# Patient Record
Sex: FEMALE | Race: WHITE
[De-identification: names, ages, dates, MRNs, and addresses within clinical notes are randomized per-mention and may not be internally consistent; named-entity substitution may affect disease eponyms.]

---

## 2018-10-16 ENCOUNTER — HOSPITAL ENCOUNTER (INPATIENT)
Dept: HOSPITAL 74 - JLDR | Age: 33
LOS: 2 days | Discharge: HOME | End: 2018-10-18
Attending: OBSTETRICS & GYNECOLOGY | Admitting: OBSTETRICS & GYNECOLOGY
Payer: COMMERCIAL

## 2018-10-16 VITALS — BODY MASS INDEX: 29.7 KG/M2

## 2018-10-16 DIAGNOSIS — O48.0: Primary | ICD-10-CM

## 2018-10-16 DIAGNOSIS — Z3A.40: ICD-10-CM

## 2018-10-16 LAB
ANION GAP SERPL CALC-SCNC: 9 MMOL/L (ref 8–16)
APTT BLD: 26.1 SECONDS (ref 25.2–36.5)
ARTERIAL BLOOD GAS PCO2: 51.7 MMHG (ref 35–45)
BASE EXCESS BLDA CALC-SCNC: -4.1 MEQ/L (ref -2–2)
BASOPHILS # BLD: 0.3 % (ref 0–2)
BUN SERPL-MCNC: 10 MG/DL (ref 7–18)
CALCIUM SERPL-MCNC: 8.1 MG/DL (ref 8.5–10.1)
CHLORIDE SERPL-SCNC: 109 MMOL/L (ref 98–107)
CO2 SERPL-SCNC: 22 MMOL/L (ref 21–32)
CREAT SERPL-MCNC: 0.6 MG/DL (ref 0.55–1.3)
DEPRECATED RDW RBC AUTO: 13.8 % (ref 11.6–15.6)
EOSINOPHIL # BLD: 2.6 % (ref 0–4.5)
GLUCOSE SERPL-MCNC: 69 MG/DL (ref 74–106)
HCT VFR BLD CALC: 37.1 % (ref 32.4–45.2)
HGB BLD-MCNC: 12.2 GM/DL (ref 10.7–15.3)
INR BLD: 0.91 (ref 0.83–1.09)
LYMPHOCYTES # BLD: 31.2 % (ref 8–40)
MCH RBC QN AUTO: 28.3 PG (ref 25.7–33.7)
MCHC RBC AUTO-ENTMCNC: 33 G/DL (ref 32–36)
MCV RBC: 85.7 FL (ref 80–96)
MONOCYTES # BLD AUTO: 6.2 % (ref 3.8–10.2)
NEUTROPHILS # BLD: 59.7 % (ref 42.8–82.8)
PH BLDV: 7.32 [PH] (ref 7.32–7.42)
PLATELET # BLD AUTO: 138 K/MM3 (ref 134–434)
PMV BLD: 10.7 FL (ref 7.5–11.1)
PO2 BLDA: 17.9 MMHG (ref 80–100)
POTASSIUM SERPLBLD-SCNC: 4.1 MMOL/L (ref 3.5–5.1)
PT PNL PPP: 10.7 SEC (ref 9.7–13)
RBC # BLD AUTO: 4.33 M/MM3 (ref 3.6–5.2)
SAO2 % BLDA: 24.5 % (ref 90–98.9)
SODIUM SERPL-SCNC: 140 MMOL/L (ref 136–145)
VENOUS PC02: 44.6 MMHG (ref 38–52)
VENOUS PO2: 31.9 MMHG (ref 28–48)
WBC # BLD AUTO: 6.9 K/MM3 (ref 4–10)

## 2018-10-16 PROCEDURE — 0HQ9XZZ REPAIR PERINEUM SKIN, EXTERNAL APPROACH: ICD-10-PCS | Performed by: OBSTETRICS & GYNECOLOGY

## 2018-10-16 RX ADMIN — IBUPROFEN PRN MG: 600 TABLET, FILM COATED ORAL at 19:47

## 2018-10-16 RX ADMIN — ACETAMINOPHEN PRN MG: 325 TABLET ORAL at 19:46

## 2018-10-16 NOTE — HP
Past Medical History





- Primary Care Physician


PCP:: Lester Damon





- Admission


Chief Complaint: 34yo P1 with pregnancy at EGA 40w4d admitted in early 

spontaneous labor.


History of Present Illness: 





Pt was supposed to come if for labor induction but presented in spontaneous 

labor.


Post term pregnancy


AMA


History Source: Patient, Medical Record


Limitations to Obtaining History: No Limitations





- Past Medical History


CNS: No: Alzheimer's, CVA, Dementia, Migraine, Multiple Sclerosis, Peripheral 

Neuropathy, Parkinson's, Seizure, Syncope, TIA, Vertigo, Other


Cardiovascular: No: AFIB, Aneurysm, Aortic Insufficiency, Aortic Stenosis, CAD, 

CHF, Deep Vein Thrombosis, HTN, Hyperlipdemia, MI, Mitral Insufficiency, Mitral 

Stenosis, Murmur, Pulmonary Hypertension, Other


Pulmonary: No: Asthma, Bronchitis, Cancer, COPD, O2 Dependent, Pneumonia, 

Previously Intubated, Pulmonary Embolus, Pulmonary Fibrosis, Sleep Apnea, Other


Gastrointestinal: No: Ascites, Cancer, Constipation, Crohn's Disease, 

Diverticulitis, Diverticulosis, Esophageal Varices, Gastritis, GERD, GI Bleed, 

Hemorrhoids, Hiatal Hernia, Inflamatory Bowel Disease, Irritable Bowel Disease, 

Pancreatitis, Peptic Ulcer Disease, Ulcerative Colitis, Other


Hepatobiliary: No: Cirrhosis, Cholelithiasis, Cholecystitis, Choledocholithiasis

, Hepatitis A, Hepatitis B, Hepatitis C, Other


Renal/: No: Renal Failure, Renal Inusuff, BPH, Cancer, Hematuria, Hemodialysis

, Neurogenic Bladder, Renal Calculi, UTI, Other


Reproductive: No: Ectopic Pregnancy, Endometriosis, Fibroids, PID, Polycystic 

Ovary Syndrome, Postmenopausal, Other


...: 2


...Para: 1


...Term: 1


...: 0


...Spon : 0


...Induced : 0


...Multiple Gestation: 0


...LMP: 18


... Weeks Gestation by Dates: 40.4


...EDC by Dates: 10/15/18


...EDC by Sono: 10/12/18


Heme/Onc: No: Anemia, B12 Deficiency, Bleeding Disorder, Cancer, Current 

Chemotherapy, Current Radiation Therapy, Hemochromatosis, Hypercoaguable State, 

Myeloproliferative Synd, Sickle Cell Disease, Sickle Cell Trait, 

Thrombocytopenia, Other


Infectious Disease: No: AIDS, C-Diff, Herpes Zoster, HIV, MRSA, STD's, 

Tuberculosis, VREF, Other


Psych: No: Addictions, Anxiety, Bipolar, Depression, Panic, Psychosis, 

Schizophrenia, Other


Musculoskeletal: No: Bursitis, Chronic low back pain, Hemiparesis, Hemiplegia, 

Osteoarthritis, Paraplegia, Other


Rheumatology: No: Fibromyalgia, Gout, Lupus, Rheumatoid Arthritis, Sarcoidosis, 

Vasculitis, Other


ENT: No: Allergic Rhinitis, Sinusitis, Other


Endocrine: No: [place]'s Disease, Cushing's Disease, Diabetes Insipidus, 

Diabetes Mellitus, Hyperparathyroidism, Hyperthyroidism, Hypothyroidism, 

Osteopenia, SIADH, Other


Dermatology: No: Basal Cell, Cellulitis, Eczema, Melanoma, Psoriasis, Squamous 

Cell, Other





- Past Surgical History


Past Surgical History: Yes: None


Hx Myomectomy: No


Hx Transabdominal Cerclage: No





- Smoking History


Smoking history: Never smoked


Have you smoked in the past 12 months: No





- Alcohol/Substance Use


Hx Alcohol Use: No


History of Substance Use: reports: None





- Social History


Usual Living Arrangement: Yes: With Spouse, With Child


ADL: Independent


Occupation: Teacher


History of Recent Travel: No





Home Medications





- Allergies


Allergies/Adverse Reactions: 


 Allergies











Allergy/AdvReac Type Severity Reaction Status Date / Time


 


No Known Allergies Allergy   Verified 10/16/18 08:13














- Home Medications


Home Medications: 


Ambulatory Orders





Prenatal Vit/Iron Fumarate/FA [Prenatal Tablet] 1 each PO DAILY 03/10/15 











Family Disease History





- Family Disease History


Family History: Denies





Review of Systems





- Review of Systems


Constitutional: reports: Other (Spontaneous contraction)


Eyes: reports: No Symptoms


HENT: reports: No Symptoms


Neck: reports: No Symptoms


Cardiovascular: reports: No Symptoms


Respiratory: reports: No Symptoms


Gastrointestinal: reports: No Symptoms


Genitourinary: reports: No Symptoms


Breasts: reports: No Symptoms Reported


Musculoskeletal: reports: No Symptoms


Integumentary: reports: No Symptoms


Neurological: reports: No Symptoms


Endocrine: reports: No Symptoms


Hematology/Lymphatic: reports: No Symptoms


Psychiatric: reports: No Symptoms


Pain Intensity: 6





Physical Exam - Maternity


Vital Signs: 


 Vital Signs











Temperature  97.3 F L  10/16/18 07:10


 


Pulse Rate  86   10/16/18 07:10


 


Respiratory Rate  18   10/16/18 07:10


 


Blood Pressure  125/62   10/16/18 07:10


 


O2 Sat by Pulse Oximetry (%)      











Constitutional: Yes: Well Nourished, No Distress, Calm


Eyes: Yes: WNL, Conjunctiva Clear


HENT: Yes: WNL, Atraumatic, Normocephalic


Neck: Yes: WNL, Supple, Trachea Midline


Cardiovascular: Yes: WNL, Regular Rate and Rhythm


Lungs: Clear to auscultation, Normal air movement





- Abdominal Exam/OB


Fundal Height: 40


Number of Fetuses: Single


Fetal Presentation: Vertex


Contractions: Yes


Regularity: Regular


Intensity: Moderate


Fetal Monitor Mode: External


Fetal Heart Rate (range): 135


Fetal Heart Rate Location: Midline


Category: I


Accelerations: Non-Uniform


Decelerations: None





- Vaginal Exam/OB


Vaginal Bleediing: No


Speculum Exam: No


Dilatation (cm): 2


Effacement (%): 80


Amniotic Membrane Status: Intact


Fetal Presentation: Vertex/Position


Fetal Station: -3





- Physical Exam


Musculoskeletal: Yes: WNL


Extremities: Yes: WNL


Edema: No


Integumentary: Yes: WNL


Deep Tendon Reflex Grade: Normal +2


...Motor Strength: WNL


Psychiatric: Yes: WNL, Alert, Oriented





- Labs


Lab Results: 


 CBC, BMP





 10/16/18 08:30 











Hemorrhage Risk Assessment





- Risk Factors


Medium Risk Factors: Yes: None


High Risk Factors: Yes: None


Risk Score: 1


Risk Level: Medium Risk





Assessment/Plan





34yo P1 with pregnancy at EGA 40w4d admitted in early spontaneous labor. Fetus 

with Category I tracing and requires no intervention. Patient is stable and 

requested IV sedation for pain and analgesia. Adequate gynecoid pelvimetry. 

Plan to monitor labor progress. Anticipate .

## 2018-10-16 NOTE — PN
Delivery





- Delivery


Vaginal Delivery: No Problems, Spontaneous


Type of Anesthesia: Epidural


Episiotomy/Laceration: Vaginal Extension/lac, 1st degree


EBL (cc): 300





Delivery, Single Birth





- Stages of Labor


Date 1st Stage Initiatied: 10/16/18


Time 1st Stage Initiated: 01:00


Date 2nd Stage Initiated: 10/16/18


Time 2nd Stage Initiated: 14:40


Date of Delivery: 10/16/18


Time of Delivery: 14:53


Date Placenta Delivered: 10/16/18


Time Placenta Delivered: 14:55


Placenta: Yes: Spontaneous, Normal Configuration





- Condition of Infant


Pediatrician/Neonatologist Present: No


Infant Gender: Male


Birth Weight: 3.884 kg


Position: Right, OA


Total Hours ROM (Hrs/Mins): 2 hours 25 minutes





- Apgar


  ** 1 Minute


Apgar Total Score: 9





  ** 5 Minutes


Apgar Total Score: 10





- [place] Feeding Plan


Initial Plan: Exclusive breastfeeding throughout hospitalization


Benefits of Breastfeeding Exclusively reinforced: Yes





Remarks





- Remarks


Remarks: 





 w/o complications.

## 2018-10-16 NOTE — PN
Ante-Partal Exam





- Subjective


Subjective: 





No complaints, s/p epidural


Vital Signs: 


 Vital Signs











Temperature  98.5 F   10/16/18 09:00


 


Pulse Rate  80   10/16/18 10:00


 


Respiratory Rate  18   10/16/18 10:00


 


Blood Pressure  127/80   10/16/18 10:00


 


O2 Sat by Pulse Oximetry (%)      











Bleeding: No


Headache: No


Visual changes: No


Right upper quadrant pain: No


Pain (scale 1-10): 0





- Contractions


Contractions: Yes


Regularity: Regular (q3min)


Intensity: Moderate


Fetal Monitor Mode: External





- Exam during Labor


Fetal Heart Rate: 135


Variability: Moderate


Fetal Heart Rate Location: Midline


Category: I


Fetal Monitor Accelerations: Present


Fetal Monitor Decelerations: None


Exam: Vaginal


Dilatation (cm): 8


Effacement (%): 90


Amniotic Membrane Status: Ruptured (AROM)


Amniotic Fluid: Meconium Stained


Meconium Staining: Moderate


Fetal Presentation: Vertex


Fetal Station: 0





- Intrapartum Hemorrhage Risk


Medium Risk Factors: None


High Risk Factors: None


Risk Score: 0


Risk Level: Low Risk





- Assessment/Plan


Assessment/Plan: 





34yo P1 with spont labor. Pt progressed in active labor. Fetus with Category I 

tracing. Plan to monitor progress.

## 2018-10-17 VITALS — HEART RATE: 77 BPM

## 2018-10-17 RX ADMIN — ACETAMINOPHEN PRN MG: 325 TABLET ORAL at 21:07

## 2018-10-17 RX ADMIN — ACETAMINOPHEN PRN MG: 325 TABLET ORAL at 15:37

## 2018-10-17 RX ADMIN — IBUPROFEN PRN MG: 600 TABLET, FILM COATED ORAL at 15:39

## 2018-10-17 RX ADMIN — IBUPROFEN PRN MG: 600 TABLET, FILM COATED ORAL at 21:07

## 2018-10-17 RX ADMIN — Medication SCH TAB: at 10:27

## 2018-10-18 VITALS — SYSTOLIC BLOOD PRESSURE: 112 MMHG | DIASTOLIC BLOOD PRESSURE: 63 MMHG | TEMPERATURE: 97.9 F

## 2018-10-18 RX ADMIN — Medication SCH TAB: at 10:27

## 2018-10-18 RX ADMIN — IBUPROFEN PRN MG: 600 TABLET, FILM COATED ORAL at 08:11

## 2018-10-18 RX ADMIN — ACETAMINOPHEN PRN MG: 325 TABLET ORAL at 08:12

## 2018-10-18 NOTE — PN
Post Partum Progress Note





- Subjective


Subjective: 





Patient without acute complaints. 


Reports tolerating oral intake without nausea or vomiting. 


Ambulating without dizziness. 


Denies fevers or chills.


Pain well controlled with oral pain medication.


Breastfeeding without difficulty.


Passing flatus.


Post Partum Day: 2


Type of Delivery: 


Vital Signs: 


 Vital Signs











Temperature  97.7 F   10/17/18 21:00


 


Pulse Rate  77   10/17/18 21:00


 


Respiratory Rate  18   10/17/18 21:00


 


Blood Pressure  105/64   10/17/18 21:00


 


O2 Sat by Pulse Oximetry (%)  100   10/16/18 15:45











Breast Exam: Yes: Engorged


Uterus: Yes: Fundus Firm, Fundus below umbilicus


Abdomen/GI: Yes: Abdomen soft, Passing flatus, Tolerating PO.  No: Abdominal 

Distention, Tender


Lochia: Yes: Serosa


Lochia, amount: Small


Extremities: Yes: Calves non-tender, Edema (trace)


Activity: Ambulating





- Labs


Labs: 


 CBC











WBC  6.9 K/mm3 (4.0-10.0)   10/16/18  08:30    


 


RBC  4.33 M/mm3 (3.60-5.2)   10/16/18  08:30    


 


Hgb  12.2 GM/dL (10.7-15.3)   10/16/18  08:30    


 


Hct  37.1 % (32.4-45.2)  D 10/16/18  08:30    


 


MCV  85.7 fl (80-96)   10/16/18  08:30    


 


MCH  28.3 pg (25.7-33.7)   10/16/18  08:30    


 


MCHC  33.0 g/dl (32.0-36.0)   10/16/18  08:30    


 


RDW  13.8 % (11.6-15.6)   10/16/18  08:30    


 


Plt Count  138 K/MM3 (134-434)   10/16/18  08:30    


 


MPV  10.7 fl (7.5-11.1)  D 10/16/18  08:30    


 


Absolute Neuts (auto)  4.1 K/mm3 (1.5-8.0)   10/16/18  08:30    


 


Neutrophils %  59.7 % (42.8-82.8)   10/16/18  08:30    


 


Lymphocytes %  31.2 % (8-40)  D 10/16/18  08:30    


 


Monocytes %  6.2 % (3.8-10.2)   10/16/18  08:30    


 


Eosinophils %  2.6 % (0-4.5)   10/16/18  08:30    


 


Basophils %  0.3 % (0-2.0)   10/16/18  08:30    


 


Nucleated RBC %  0 % (0-0)   10/16/18  08:30    














Assessment/Plan





34 yo PPD # 2 s/p , afebrile, vital signs stable, doing well 


1. Patient stable for discharge home today.


2. Patient encouraged to contact MD for:


- Severe pain not controlled by oral pain medication


- Fevers or chills


- Nausea or vomiting, intolerance of oral intake


3. Patient to follow up in office in 4-6 weeks for postpartum visit

## 2018-10-18 NOTE — DS
Physical Exam-GYN


Vital Signs: 


 Vital Signs











Temperature  97.7 F   10/17/18 21:00


 


Pulse Rate  77   10/17/18 21:00


 


Respiratory Rate  18   10/17/18 21:00


 


Blood Pressure  105/64   10/17/18 21:00


 


O2 Sat by Pulse Oximetry (%)  100   10/16/18 15:45











Constitutional: Yes: Well Nourished, No Distress, Calm


Eyes: Yes: WNL, Conjunctiva Clear, EOM Intact


HENT: Yes: WNL, Atraumatic, Normocephalic


Neck: Yes: WNL, Supple, Trachea Midline


Cardiovascular: Yes: WNL, Regular Rate and Rhythm


Respiratory: Yes: WNL, Regular, CTA Bilaterally


Gastrointestinal: Yes: WNL, Normal Bowel Sounds, Soft


...Rectal Exam: Yes: Deferred


Renal/: Yes: WNL


....Post Partum: Yes: Uterus firm, Uterus non-tender, Slight lochia rubra


Breast(s): Yes: WNL


Musculoskeletal: Yes: WNL


Extremities: Yes: WNL


Edema: Yes


Edema: LLE: Trace, RLE: Trace


Integumentary: Yes: WNL


Neurological: Yes: WNL, Alert, Oriented


...Motor Strength: WNL


Psychiatric: Yes: WNL, Alert, Oriented


Labs: 


 CBC, BMP





 10/16/18 08:30 





 10/16/18 08:30 











Delivery





- Delivery


Vaginal Delivery: No Problems, Spontaneous


Type of Anesthesia: Epidural


Episiotomy/Laceration: Vaginal Extension/lac, 1st degree


EBL (cc): 300





Delivery, Single Birth





- Stages of Labor


Date 1st Stage Initiatied: 10/16/18


Time 1st Stage Initiated: 01:00


Date 2nd Stage Initiated: 10/16/18


Time 2nd Stage Initiated: 14:40


Date of Delivery: 10/16/18


Time of Delivery: 14:53


Date Placenta Delivered: 10/16/18


Time Placenta Delivered: 14:55


Placenta: Yes: Spontaneous, Normal Configuration





- Condition of Infant


Pediatrician/Neonatologist Present: No


Infant Gender: Male


Birth Weight: 3.884 kg


Position: Right, OA


Total Hours ROM (Hrs/Mins): 2 hours 25 minutes





- Apgar


  ** 1 Minute


Apgar Total Score: 9





  ** 5 Minutes


Apgar Total Score: 10





-  Feeding Plan


Initial Plan: Exclusive breastfeeding throughout hospitalization


Benefits of Breastfeeding Exclusively reinforced: Yes





Discharge Summary


Reason For Visit: INDUCTION OF LABOR


Current Active Problems





Vaginal delivery (Acute)








Procedures: Principal:  w/o problems


Hospital Course: 





Normal recovery


Condition: Good





- Instructions


Diet, Activity, Other Instructions: 





Physical activity


Resume your normal everyday activity as tolerated no heavy lifting or exercise 

until seen by your surgeon. You may walk unlimited jerry of and climb stairs. 

You may resume driving the car when you feel safe and comfortable behind the 

wheel. No sexual activity as instructed.





Diet


There are no dietary restrictions. Eat healthy, high-fiber foods. Drink 6 to 8 

glasses of liquid each day. This will assist in keeping your bowels are regular.





Pain management


You may take Tylenol or acetaminophen or Ibuprofen (for example, Motrin, Advil 

etc.) from my pain prescription medication is ordered should be taken as 

prescribed for moderate to severe pain.





Call MD for any of the following:


Severe pain not relieved by medication


Fever of 101 or higher


Excessive bleeding or drainage on dressing


Inability to urinate

















Referrals: 


Lester Damon MD [Staff Physician] - 


Disposition: HOME





- Home Medications


Comprehensive Discharge Medication List: 


Ambulatory Orders





Prenatal Vit/Iron Fumarate/FA [Prenatal Tablet] 1 each PO DAILY 03/10/15

## 2022-08-20 ENCOUNTER — HOSPITAL ENCOUNTER (EMERGENCY)
Dept: HOSPITAL 74 - FER | Age: 37
Discharge: HOME | End: 2022-08-20
Payer: COMMERCIAL

## 2022-08-20 VITALS
TEMPERATURE: 98.4 F | RESPIRATION RATE: 16 BRPM | HEART RATE: 88 BPM | SYSTOLIC BLOOD PRESSURE: 118 MMHG | DIASTOLIC BLOOD PRESSURE: 73 MMHG

## 2022-08-20 VITALS — BODY MASS INDEX: 25.1 KG/M2

## 2022-08-20 DIAGNOSIS — M62.838: Primary | ICD-10-CM

## 2022-08-20 PROCEDURE — 3E0233Z INTRODUCTION OF ANTI-INFLAMMATORY INTO MUSCLE, PERCUTANEOUS APPROACH: ICD-10-PCS
